# Patient Record
Sex: MALE | Race: WHITE | NOT HISPANIC OR LATINO | Employment: FULL TIME | URBAN - METROPOLITAN AREA
[De-identification: names, ages, dates, MRNs, and addresses within clinical notes are randomized per-mention and may not be internally consistent; named-entity substitution may affect disease eponyms.]

---

## 2024-04-05 ENCOUNTER — HOSPITAL ENCOUNTER (OUTPATIENT)
Facility: HOSPITAL | Age: 34
Setting detail: OBSERVATION
Discharge: HOME | End: 2024-04-06
Attending: STUDENT IN AN ORGANIZED HEALTH CARE EDUCATION/TRAINING PROGRAM | Admitting: INTERNAL MEDICINE
Payer: COMMERCIAL

## 2024-04-05 ENCOUNTER — APPOINTMENT (OUTPATIENT)
Dept: RADIOLOGY | Facility: HOSPITAL | Age: 34
End: 2024-04-05
Payer: COMMERCIAL

## 2024-04-05 DIAGNOSIS — R10.30 LOWER ABDOMINAL PAIN: ICD-10-CM

## 2024-04-05 DIAGNOSIS — D58.2 ELEVATED HEMOGLOBIN (CMS-HCC): Primary | ICD-10-CM

## 2024-04-05 PROBLEM — N17.9 ACUTE KIDNEY INJURY (CMS-HCC): Status: ACTIVE | Noted: 2024-04-05

## 2024-04-05 PROBLEM — G47.30 SLEEP APNEA: Status: ACTIVE | Noted: 2024-04-05

## 2024-04-05 PROBLEM — R10.84 GENERALIZED ABDOMINAL PAIN: Status: ACTIVE | Noted: 2024-04-05

## 2024-04-05 PROBLEM — E86.0 DEHYDRATION: Status: ACTIVE | Noted: 2024-04-05

## 2024-04-05 LAB
ALBUMIN SERPL BCP-MCNC: 5.3 G/DL (ref 3.4–5)
ALP SERPL-CCNC: 73 U/L (ref 33–120)
ALT SERPL W P-5'-P-CCNC: 17 U/L (ref 10–52)
ANION GAP SERPL CALC-SCNC: 12 MMOL/L (ref 10–20)
AST SERPL W P-5'-P-CCNC: 17 U/L (ref 9–39)
BASOPHILS # BLD AUTO: 0.08 X10*3/UL (ref 0–0.1)
BASOPHILS NFR BLD AUTO: 0.4 %
BILIRUB SERPL-MCNC: 0.6 MG/DL (ref 0–1.2)
BUN SERPL-MCNC: 16 MG/DL (ref 6–23)
CALCIUM SERPL-MCNC: 10.1 MG/DL (ref 8.6–10.3)
CHLORIDE SERPL-SCNC: 106 MMOL/L (ref 98–107)
CO2 SERPL-SCNC: 28 MMOL/L (ref 21–32)
CREAT SERPL-MCNC: 1.5 MG/DL (ref 0.5–1.3)
EGFRCR SERPLBLD CKD-EPI 2021: 63 ML/MIN/1.73M*2
EOSINOPHIL # BLD AUTO: 0.1 X10*3/UL (ref 0–0.7)
EOSINOPHIL NFR BLD AUTO: 0.5 %
ERYTHROCYTE [DISTWIDTH] IN BLOOD BY AUTOMATED COUNT: 12 % (ref 11.5–14.5)
GLUCOSE SERPL-MCNC: 113 MG/DL (ref 74–99)
HCT VFR BLD AUTO: 62.1 % (ref 41–52)
HGB BLD-MCNC: 22 G/DL (ref 13.5–17.5)
IMM GRANULOCYTES # BLD AUTO: 0.11 X10*3/UL (ref 0–0.7)
IMM GRANULOCYTES NFR BLD AUTO: 0.6 % (ref 0–0.9)
LACTATE SERPL-SCNC: 1.2 MMOL/L (ref 0.4–2)
LIPASE SERPL-CCNC: 13 U/L (ref 9–82)
LYMPHOCYTES # BLD AUTO: 1.2 X10*3/UL (ref 1.2–4.8)
LYMPHOCYTES NFR BLD AUTO: 6.2 %
MAGNESIUM SERPL-MCNC: 2.13 MG/DL (ref 1.6–2.4)
MCH RBC QN AUTO: 32.4 PG (ref 26–34)
MCHC RBC AUTO-ENTMCNC: 35.4 G/DL (ref 32–36)
MCV RBC AUTO: 92 FL (ref 80–100)
MONOCYTES # BLD AUTO: 1.09 X10*3/UL (ref 0.1–1)
MONOCYTES NFR BLD AUTO: 5.6 %
NEUTROPHILS # BLD AUTO: 16.85 X10*3/UL (ref 1.2–7.7)
NEUTROPHILS NFR BLD AUTO: 86.7 %
NRBC BLD-RTO: 0 /100 WBCS (ref 0–0)
PHOSPHATE SERPL-MCNC: 4.1 MG/DL (ref 2.5–4.9)
PLATELET # BLD AUTO: 189 X10*3/UL (ref 150–450)
POTASSIUM SERPL-SCNC: 4.7 MMOL/L (ref 3.5–5.3)
PROT SERPL-MCNC: 8.2 G/DL (ref 6.4–8.2)
RBC # BLD AUTO: 6.78 X10*6/UL (ref 4.5–5.9)
SODIUM SERPL-SCNC: 141 MMOL/L (ref 136–145)
WBC # BLD AUTO: 19.4 X10*3/UL (ref 4.4–11.3)

## 2024-04-05 PROCEDURE — 74177 CT ABD & PELVIS W/CONTRAST: CPT

## 2024-04-05 PROCEDURE — 96361 HYDRATE IV INFUSION ADD-ON: CPT

## 2024-04-05 PROCEDURE — 85025 COMPLETE CBC W/AUTO DIFF WBC: CPT | Performed by: STUDENT IN AN ORGANIZED HEALTH CARE EDUCATION/TRAINING PROGRAM

## 2024-04-05 PROCEDURE — 2500000004 HC RX 250 GENERAL PHARMACY W/ HCPCS (ALT 636 FOR OP/ED): Performed by: STUDENT IN AN ORGANIZED HEALTH CARE EDUCATION/TRAINING PROGRAM

## 2024-04-05 PROCEDURE — 83690 ASSAY OF LIPASE: CPT | Performed by: STUDENT IN AN ORGANIZED HEALTH CARE EDUCATION/TRAINING PROGRAM

## 2024-04-05 PROCEDURE — 83735 ASSAY OF MAGNESIUM: CPT | Performed by: STUDENT IN AN ORGANIZED HEALTH CARE EDUCATION/TRAINING PROGRAM

## 2024-04-05 PROCEDURE — 2500000004 HC RX 250 GENERAL PHARMACY W/ HCPCS (ALT 636 FOR OP/ED): Performed by: NURSE PRACTITIONER

## 2024-04-05 PROCEDURE — 74177 CT ABD & PELVIS W/CONTRAST: CPT | Mod: FOREIGN READ | Performed by: RADIOLOGY

## 2024-04-05 PROCEDURE — G0378 HOSPITAL OBSERVATION PER HR: HCPCS

## 2024-04-05 PROCEDURE — 83605 ASSAY OF LACTIC ACID: CPT | Performed by: STUDENT IN AN ORGANIZED HEALTH CARE EDUCATION/TRAINING PROGRAM

## 2024-04-05 PROCEDURE — 96374 THER/PROPH/DIAG INJ IV PUSH: CPT

## 2024-04-05 PROCEDURE — 2550000001 HC RX 255 CONTRASTS: Performed by: STUDENT IN AN ORGANIZED HEALTH CARE EDUCATION/TRAINING PROGRAM

## 2024-04-05 PROCEDURE — 84100 ASSAY OF PHOSPHORUS: CPT | Performed by: STUDENT IN AN ORGANIZED HEALTH CARE EDUCATION/TRAINING PROGRAM

## 2024-04-05 PROCEDURE — 36415 COLL VENOUS BLD VENIPUNCTURE: CPT | Performed by: STUDENT IN AN ORGANIZED HEALTH CARE EDUCATION/TRAINING PROGRAM

## 2024-04-05 PROCEDURE — 80053 COMPREHEN METABOLIC PANEL: CPT | Performed by: STUDENT IN AN ORGANIZED HEALTH CARE EDUCATION/TRAINING PROGRAM

## 2024-04-05 PROCEDURE — 99285 EMERGENCY DEPT VISIT HI MDM: CPT | Mod: 25

## 2024-04-05 PROCEDURE — 99222 1ST HOSP IP/OBS MODERATE 55: CPT | Performed by: NURSE PRACTITIONER

## 2024-04-05 PROCEDURE — 87040 BLOOD CULTURE FOR BACTERIA: CPT | Mod: PORLAB | Performed by: STUDENT IN AN ORGANIZED HEALTH CARE EDUCATION/TRAINING PROGRAM

## 2024-04-05 RX ORDER — ACETAMINOPHEN 325 MG/1
650 TABLET ORAL EVERY 4 HOURS PRN
Status: DISCONTINUED | OUTPATIENT
Start: 2024-04-05 | End: 2024-04-06 | Stop reason: HOSPADM

## 2024-04-05 RX ORDER — DEXTROAMPHETAMINE SACCHARATE, AMPHETAMINE ASPARTATE, DEXTROAMPHETAMINE SULFATE AND AMPHETAMINE SULFATE 2.5; 2.5; 2.5; 2.5 MG/1; MG/1; MG/1; MG/1
10 TABLET ORAL DAILY
COMMUNITY

## 2024-04-05 RX ORDER — ONDANSETRON HYDROCHLORIDE 2 MG/ML
4 INJECTION, SOLUTION INTRAVENOUS EVERY 8 HOURS PRN
Status: DISCONTINUED | OUTPATIENT
Start: 2024-04-05 | End: 2024-04-06 | Stop reason: HOSPADM

## 2024-04-05 RX ORDER — ONDANSETRON 4 MG/1
4 TABLET, FILM COATED ORAL EVERY 8 HOURS PRN
COMMUNITY

## 2024-04-05 RX ORDER — ARMODAFINIL 250 MG/1
250 TABLET ORAL DAILY
Status: DISCONTINUED | OUTPATIENT
Start: 2024-04-06 | End: 2024-04-06 | Stop reason: HOSPADM

## 2024-04-05 RX ORDER — SODIUM CHLORIDE, SODIUM LACTATE, POTASSIUM CHLORIDE, CALCIUM CHLORIDE 600; 310; 30; 20 MG/100ML; MG/100ML; MG/100ML; MG/100ML
125 INJECTION, SOLUTION INTRAVENOUS CONTINUOUS
Status: DISCONTINUED | OUTPATIENT
Start: 2024-04-05 | End: 2024-04-06 | Stop reason: HOSPADM

## 2024-04-05 RX ORDER — DICYCLOMINE HYDROCHLORIDE 10 MG/1
20 CAPSULE ORAL EVERY 8 HOURS PRN
Status: DISCONTINUED | OUTPATIENT
Start: 2024-04-05 | End: 2024-04-06 | Stop reason: HOSPADM

## 2024-04-05 RX ORDER — KETOROLAC TROMETHAMINE 30 MG/ML
15 INJECTION, SOLUTION INTRAMUSCULAR; INTRAVENOUS ONCE
Status: COMPLETED | OUTPATIENT
Start: 2024-04-05 | End: 2024-04-05

## 2024-04-05 RX ORDER — ACETAMINOPHEN 650 MG/1
650 SUPPOSITORY RECTAL EVERY 4 HOURS PRN
Status: DISCONTINUED | OUTPATIENT
Start: 2024-04-05 | End: 2024-04-06 | Stop reason: HOSPADM

## 2024-04-05 RX ORDER — DEXTROAMPHETAMINE SACCHARATE, AMPHETAMINE ASPARTATE, DEXTROAMPHETAMINE SULFATE AND AMPHETAMINE SULFATE 1.25; 1.25; 1.25; 1.25 MG/1; MG/1; MG/1; MG/1
10 TABLET ORAL DAILY
COMMUNITY
End: 2024-04-05 | Stop reason: ENTERED-IN-ERROR

## 2024-04-05 RX ORDER — ACETAMINOPHEN 160 MG/5ML
650 SOLUTION ORAL EVERY 4 HOURS PRN
Status: DISCONTINUED | OUTPATIENT
Start: 2024-04-05 | End: 2024-04-06 | Stop reason: HOSPADM

## 2024-04-05 RX ORDER — DEXTROAMPHETAMINE SACCHARATE, AMPHETAMINE ASPARTATE, DEXTROAMPHETAMINE SULFATE AND AMPHETAMINE SULFATE 1.25; 1.25; 1.25; 1.25 MG/1; MG/1; MG/1; MG/1
10 TABLET ORAL DAILY
Status: DISCONTINUED | OUTPATIENT
Start: 2024-04-05 | End: 2024-04-06 | Stop reason: HOSPADM

## 2024-04-05 RX ORDER — ARMODAFINIL 250 MG/1
250 TABLET ORAL DAILY
COMMUNITY

## 2024-04-05 RX ORDER — ONDANSETRON 4 MG/1
4 TABLET, ORALLY DISINTEGRATING ORAL EVERY 8 HOURS PRN
Status: DISCONTINUED | OUTPATIENT
Start: 2024-04-05 | End: 2024-04-06 | Stop reason: HOSPADM

## 2024-04-05 RX ADMIN — SODIUM CHLORIDE, POTASSIUM CHLORIDE, SODIUM LACTATE AND CALCIUM CHLORIDE 1000 ML: 600; 310; 30; 20 INJECTION, SOLUTION INTRAVENOUS at 14:17

## 2024-04-05 RX ADMIN — SODIUM CHLORIDE, POTASSIUM CHLORIDE, SODIUM LACTATE AND CALCIUM CHLORIDE 1000 ML: 600; 310; 30; 20 INJECTION, SOLUTION INTRAVENOUS at 11:20

## 2024-04-05 RX ADMIN — IOHEXOL 75 ML: 350 INJECTION, SOLUTION INTRAVENOUS at 12:32

## 2024-04-05 RX ADMIN — KETOROLAC TROMETHAMINE 15 MG: 30 INJECTION, SOLUTION INTRAMUSCULAR; INTRAVENOUS at 11:20

## 2024-04-05 RX ADMIN — SODIUM CHLORIDE, POTASSIUM CHLORIDE, SODIUM LACTATE AND CALCIUM CHLORIDE 125 ML/HR: 600; 310; 30; 20 INJECTION, SOLUTION INTRAVENOUS at 21:28

## 2024-04-05 SDOH — SOCIAL STABILITY: SOCIAL INSECURITY: DOES ANYONE TRY TO KEEP YOU FROM HAVING/CONTACTING OTHER FRIENDS OR DOING THINGS OUTSIDE YOUR HOME?: NO

## 2024-04-05 SDOH — SOCIAL STABILITY: SOCIAL INSECURITY: ARE THERE ANY APPARENT SIGNS OF INJURIES/BEHAVIORS THAT COULD BE RELATED TO ABUSE/NEGLECT?: NO

## 2024-04-05 SDOH — SOCIAL STABILITY: SOCIAL INSECURITY: HAVE YOU HAD THOUGHTS OF HARMING ANYONE ELSE?: NO

## 2024-04-05 SDOH — SOCIAL STABILITY: SOCIAL INSECURITY: HAS ANYONE EVER THREATENED TO HURT YOUR FAMILY OR YOUR PETS?: NO

## 2024-04-05 SDOH — SOCIAL STABILITY: SOCIAL INSECURITY: ARE YOU OR HAVE YOU BEEN THREATENED OR ABUSED PHYSICALLY, EMOTIONALLY, OR SEXUALLY BY ANYONE?: NO

## 2024-04-05 SDOH — SOCIAL STABILITY: SOCIAL INSECURITY: ABUSE: ADULT

## 2024-04-05 SDOH — SOCIAL STABILITY: SOCIAL INSECURITY: DO YOU FEEL ANYONE HAS EXPLOITED OR TAKEN ADVANTAGE OF YOU FINANCIALLY OR OF YOUR PERSONAL PROPERTY?: NO

## 2024-04-05 SDOH — SOCIAL STABILITY: SOCIAL INSECURITY: DO YOU FEEL UNSAFE GOING BACK TO THE PLACE WHERE YOU ARE LIVING?: NO

## 2024-04-05 ASSESSMENT — ACTIVITIES OF DAILY LIVING (ADL)
FEEDING YOURSELF: INDEPENDENT
HEARING - RIGHT EAR: FUNCTIONAL
BATHING: INDEPENDENT
ADEQUATE_TO_COMPLETE_ADL: YES
TOILETING: INDEPENDENT
PATIENT'S MEMORY ADEQUATE TO SAFELY COMPLETE DAILY ACTIVITIES?: YES
DRESSING YOURSELF: INDEPENDENT
GROOMING: INDEPENDENT
WALKS IN HOME: INDEPENDENT
JUDGMENT_ADEQUATE_SAFELY_COMPLETE_DAILY_ACTIVITIES: YES
HEARING - LEFT EAR: FUNCTIONAL
LACK_OF_TRANSPORTATION: NO

## 2024-04-05 ASSESSMENT — LIFESTYLE VARIABLES
HAVE PEOPLE ANNOYED YOU BY CRITICIZING YOUR DRINKING: NO
SUBSTANCE_ABUSE_PAST_12_MONTHS: NO
HAVE YOU EVER FELT YOU SHOULD CUT DOWN ON YOUR DRINKING: NO
HOW OFTEN DO YOU HAVE A DRINK CONTAINING ALCOHOL: NEVER
AUDIT-C TOTAL SCORE: 0
EVER FELT BAD OR GUILTY ABOUT YOUR DRINKING: NO
TOTAL SCORE: 0
HOW MANY STANDARD DRINKS CONTAINING ALCOHOL DO YOU HAVE ON A TYPICAL DAY: PATIENT DOES NOT DRINK
AUDIT-C TOTAL SCORE: 0
EVER HAD A DRINK FIRST THING IN THE MORNING TO STEADY YOUR NERVES TO GET RID OF A HANGOVER: NO
SKIP TO QUESTIONS 9-10: 1
HOW OFTEN DO YOU HAVE 6 OR MORE DRINKS ON ONE OCCASION: NEVER
PRESCIPTION_ABUSE_PAST_12_MONTHS: NO

## 2024-04-05 ASSESSMENT — PAIN - FUNCTIONAL ASSESSMENT
PAIN_FUNCTIONAL_ASSESSMENT: 0-10

## 2024-04-05 ASSESSMENT — PAIN DESCRIPTION - LOCATION
LOCATION: ABDOMEN

## 2024-04-05 ASSESSMENT — ENCOUNTER SYMPTOMS
NUMBNESS: 0
HALLUCINATIONS: 0
SHORTNESS OF BREATH: 0
CONFUSION: 0
COLOR CHANGE: 0
POLYDIPSIA: 0
BRUISES/BLEEDS EASILY: 0
BACK PAIN: 0
SINUS PAIN: 0
SORE THROAT: 0
NERVOUS/ANXIOUS: 0
APPETITE CHANGE: 0
EYE ITCHING: 0
SEIZURES: 0
FATIGUE: 0
BLOOD IN STOOL: 0
PALPITATIONS: 0
TREMORS: 0
SLEEP DISTURBANCE: 0
DIFFICULTY URINATING: 0
TROUBLE SWALLOWING: 0
HEADACHES: 0
CHILLS: 0
PHOTOPHOBIA: 0
WHEEZING: 0
UNEXPECTED WEIGHT CHANGE: 0
ARTHRALGIAS: 0
LIGHT-HEADEDNESS: 0
NECK PAIN: 0
NAUSEA: 0
COUGH: 0
DYSURIA: 0
CHOKING: 0
VOMITING: 0
FEVER: 0
CHEST TIGHTNESS: 0
DIZZINESS: 0
APNEA: 0
CONSTIPATION: 0
POLYPHAGIA: 0
DIARRHEA: 1
VOICE CHANGE: 0
WOUND: 0
EYE PAIN: 0
FREQUENCY: 0
WEAKNESS: 0
FLANK PAIN: 0
MYALGIAS: 0
HEMATURIA: 0
SPEECH DIFFICULTY: 0
ABDOMINAL PAIN: 1
EYE DISCHARGE: 0
DYSPHORIC MOOD: 0
ADENOPATHY: 0
NECK STIFFNESS: 0
ABDOMINAL DISTENTION: 0

## 2024-04-05 ASSESSMENT — COGNITIVE AND FUNCTIONAL STATUS - GENERAL
MOBILITY SCORE: 24
DAILY ACTIVITIY SCORE: 24
DAILY ACTIVITIY SCORE: 24
PATIENT BASELINE BEDBOUND: NO
MOBILITY SCORE: 24

## 2024-04-05 ASSESSMENT — PATIENT HEALTH QUESTIONNAIRE - PHQ9
2. FEELING DOWN, DEPRESSED OR HOPELESS: NOT AT ALL
SUM OF ALL RESPONSES TO PHQ9 QUESTIONS 1 & 2: 0
1. LITTLE INTEREST OR PLEASURE IN DOING THINGS: NOT AT ALL

## 2024-04-05 ASSESSMENT — COLUMBIA-SUICIDE SEVERITY RATING SCALE - C-SSRS
2. HAVE YOU ACTUALLY HAD ANY THOUGHTS OF KILLING YOURSELF?: NO
1. IN THE PAST MONTH, HAVE YOU WISHED YOU WERE DEAD OR WISHED YOU COULD GO TO SLEEP AND NOT WAKE UP?: NO
6. HAVE YOU EVER DONE ANYTHING, STARTED TO DO ANYTHING, OR PREPARED TO DO ANYTHING TO END YOUR LIFE?: NO

## 2024-04-05 ASSESSMENT — PAIN SCALES - GENERAL
PAINLEVEL_OUTOF10: 2
PAINLEVEL_OUTOF10: 2
PAINLEVEL_OUTOF10: 0 - NO PAIN
PAINLEVEL_OUTOF10: 8
PAINLEVEL_OUTOF10: 6
PAINLEVEL_OUTOF10: 7
PAINLEVEL_OUTOF10: 6

## 2024-04-05 ASSESSMENT — PAIN DESCRIPTION - PAIN TYPE: TYPE: ACUTE PAIN

## 2024-04-05 NOTE — ED NOTES
Pt arrives to ED via car from home with c/o abdominal pain, nausea, vomiting, and diarrhea that began this morning.     Code Status:  No Order    HPI     Chief Complaint   Patient presents with    Abdominal Pain     Since 7am today woken out sleep.  C/o nausea and vomiting.  Mostly diarrhea.  Took zofran pta       BP 97/56 (BP Location: Left arm, Patient Position: Sitting)   Pulse 80   Temp 37.2 °C (99 °F) (Tympanic)   Resp 20   Wt 63.5 kg (140 lb)   SpO2 98%     [place] Coma Scale Score: 15      LDA:   Peripheral IV 04/05/24 20 G Distal;Right;Upper Arm (Active)   Placement Date/Time: 04/05/24 1100   Hand Hygiene Completed: Yes  Size (Gauge): 20 G  Orientation: Distal;Right;Upper  Location: Arm  Insertion attempts: 1  Patient Tolerance: Tolerated well   Number of days: 0        BACKGROUND  No past medical history on file.  No past surgical history on file.  No current facility-administered medications on file prior to encounter.     No current outpatient medications on file prior to encounter.        ASSESSMENT  Diagnoses as of 04/05/24 1513   Elevated hemoglobin (CMS/HCC)   Lower abdominal pain       Medications Currently Running:        Medications Given:  ED Medication Administration from 04/05/2024 1015 to 04/05/2024 1513         Date/Time Order Dose Route Action Action by     04/05/2024 1120 EDT ketorolac (Toradol) injection 15 mg 15 mg intravenous Given IGNACIO Foote     04/05/2024 1120 EDT lactated Ringer's bolus 1,000 mL 1,000 mL intravenous New Bag IGNACIO Foote     04/05/2024 1220 EDT lactated Ringer's bolus 1,000 mL 0 mL intravenous Stopped IGNACIO Foote     04/05/2024 1232 EDT iohexol (OMNIPaque) 350 mg iodine/mL solution 75 mL 75 mL intravenous Given NICOLE Perdomo     04/05/2024 1417 EDT lactated Ringer's bolus 1,000 mL 1,000 mL intravenous New IGNACIO [place]                 RESULTS    Imaging:  CT abdomen pelvis w IV contrast   Final Result   Free fluid in the pelvis.  This does measure simple fluid.  There  is a   small amount of perihepatic and perisplenic ascites.   Otherwise unremarkable study.   Signed by Koffi Lemons MD         }  Labs ::99  Abnormal Labs Reviewed   CBC WITH AUTO DIFFERENTIAL - Abnormal; Notable for the following components:       Result Value    WBC 19.4 (*)     RBC 6.78 (*)     Hemoglobin 22.0 (*)     Hematocrit 62.1 (*)     Neutrophils Absolute 16.85 (*)     Monocytes Absolute 1.09 (*)     All other components within normal limits   COMPREHENSIVE METABOLIC PANEL - Abnormal; Notable for the following components:    Glucose 113 (*)     Creatinine 1.50 (*)     Albumin 5.3 (*)     All other components within normal limits                 Shell Foote RN  04/05/24 0460

## 2024-04-05 NOTE — ED NOTES
Attempted to call report to 2 [facility] nurse to call back.     Shell Foote, RN  04/05/24 7315

## 2024-04-05 NOTE — PROGRESS NOTES
Jose Anaya is a 33 y.o. male admitted for Dehydration. Pharmacy reviewed the patient's [id number] medications and allergies for accuracy.    The list below reflects the PTA list prior to pharmacy medication history. A summary a changes to the PTA medication list has been listed below. Please review each medication in order reconciliation for additional clarification and justification.    Source of information:  PATIENT    Medications added:  ARMODAFINIL 250MG 1 every day  DEXTRO/AMPHET 10MG 1 every day  ONDANSETRON 4MG 1Q8 PRN    Medications modified:    Medications to be removed:    Medications of concern:      None       Michelle Newby

## 2024-04-05 NOTE — H&P
History Obtained From: Patient    History Of Present Illness:  Jose Anaya is a 33 y.o. male with PMHx s/f chronic abdominal pain hypersomnolent syndrome sleep apnea presenting with abdominal pain and diarrhea.  The patient presented to emergency department complaining of diarrhea and abdominal pain.  He has had some abdominal pain almost his whole life.  Typically he gets episodes where he has pain in his middle and lower abdomen every couple weeks.  The pain seems to move across his abdomen and then will have extensive loose stools and then the patient will start to recover over a couple days.  This morning's episodes were severe the patient had to lay down in the shower and continued to have diarrhea.  On arrival to the emergency department the patient was afebrile at 97.2 but heart rate was tacky at 113 blood pressure 82/55 SpO2 98%, the patient was given 2 L of lactated Ringer's with improvement in his heart rate dropping down into the 80s respiratory rate remained stable at 20 and blood pressure improved to 109/69.  Lab studies in the emergency department showed the patient have a creatinine of 1.50 albumin 5.3 glucose 113 remainder of his comprehensive metabolic panel and magnesium level were negative lactate was normal.  CBC was significant for WBCs of 19.4 hemoglobin 22.0 hematocrit 62.1 platelets 189. t CT scan of the abdomen pelvis with IV contrast shows some free fluid in the pelvis small amount of perihepatic and perisplenic ascites otherwise unremarkable study.  He patient denies any fevers chills or rigors he denies any blood in his stool, is not having any urinary symptoms chest pain shortness of breath cough, he is not having any near-syncope or syncope.  The patient will be admitted for observation to continue IV hydration follow-up on renal function and to obtain hematology evaluation due to his significant elevation of hemoglobin.           ED Course:  Diagnoses as of 04/05/24 2333    Elevated hemoglobin (CMS/HCC)   Lower abdominal pain     Relevant Results  Results for orders placed or performed during the hospital encounter of 04/05/24 (from the past 24 hour(s))   CBC and Auto Differential   Result Value Ref Range    WBC 19.4 (H) 4.4 - 11.3 x10*3/uL    nRBC 0.0 0.0 - 0.0 /100 WBCs    RBC 6.78 (H) 4.50 - 5.90 x10*6/uL    Hemoglobin 22.0 (H) 13.5 - 17.5 g/dL    Hematocrit 62.1 (H) 41.0 - 52.0 %    MCV 92 80 - 100 fL    MCH 32.4 26.0 - 34.0 pg    MCHC 35.4 32.0 - 36.0 g/dL    RDW 12.0 11.5 - 14.5 %    Platelets 189 150 - 450 x10*3/uL    Neutrophils % 86.7 40.0 - 80.0 %    Immature Granulocytes %, Automated 0.6 0.0 - 0.9 %    Lymphocytes % 6.2 13.0 - 44.0 %    Monocytes % 5.6 2.0 - 10.0 %    Eosinophils % 0.5 0.0 - 6.0 %    Basophils % 0.4 0.0 - 2.0 %    Neutrophils Absolute 16.85 (H) 1.20 - 7.70 x10*3/uL    Immature Granulocytes Absolute, Automated 0.11 0.00 - 0.70 x10*3/uL    Lymphocytes Absolute 1.20 1.20 - 4.80 x10*3/uL    Monocytes Absolute 1.09 (H) 0.10 - 1.00 x10*3/uL    Eosinophils Absolute 0.10 0.00 - 0.70 x10*3/uL    Basophils Absolute 0.08 0.00 - 0.10 x10*3/uL   Comprehensive metabolic panel   Result Value Ref Range    Glucose 113 (H) 74 - 99 mg/dL    Sodium 141 136 - 145 mmol/L    Potassium 4.7 3.5 - 5.3 mmol/L    Chloride 106 98 - 107 mmol/L    Bicarbonate 28 21 - 32 mmol/L    Anion Gap 12 10 - 20 mmol/L    Urea Nitrogen 16 6 - 23 mg/dL    Creatinine 1.50 (H) 0.50 - 1.30 mg/dL    eGFR 63 >60 mL/min/1.73m*2    Calcium 10.1 8.6 - 10.3 mg/dL    Albumin 5.3 (H) 3.4 - 5.0 g/dL    Alkaline Phosphatase 73 33 - 120 U/L    Total Protein 8.2 6.4 - 8.2 g/dL    AST 17 9 - 39 U/L    Bilirubin, Total 0.6 0.0 - 1.2 mg/dL    ALT 17 10 - 52 U/L   Magnesium   Result Value Ref Range    Magnesium 2.13 1.60 - 2.40 mg/dL   Phosphorus   Result Value Ref Range    Phosphorus 4.1 2.5 - 4.9 mg/dL   Lipase   Result Value Ref Range    Lipase 13 9 - 82 U/L   Lactate   Result Value Ref Range    Lactate 1.2 0.4 -  2.0 mmol/L      CT abdomen pelvis w IV contrast    Result Date: 4/5/2024  STUDY: CT Abdomen and Pelvis with IV Contrast; 4/5/2024 12:35 PM INDICATION: Lower abdominal pain. COMPARISON: None Available. ACCESSION NUMBER(S): [id number] ORDERING CLINICIAN: KAITLIN GRAHAM TECHNIQUE: CT of the abdomen and pelvis was performed.  Contiguous axial images were obtained at 3 mm slice thickness through the abdomen and pelvis. Coronal and sagittal reconstructions at 3 mm slice thickness were performed.  Omnipaque 350--75 mL was administered intravenously.  FINDINGS: LOWER CHEST: No cardiomegaly.  No pericardial effusion.  Lung bases are clear.  ABDOMEN:  LIVER: No hepatomegaly.  Smooth surface contour.  Normal attenuation.  BILE DUCTS: No intrahepatic or extrahepatic biliary ductal dilatation.  GALLBLADDER: The gallbladder is unremarkable. STOMACH: No abnormalities identified.  PANCREAS: No masses or ductal dilatation.  SPLEEN: No splenomegaly or focal splenic lesion.  ADRENAL GLANDS: No thickening or nodules.  KIDNEYS AND URETERS: Kidneys are normal in size and location.  No renal or ureteral calculi.  PELVIS:  BLADDER: No abnormalities identified.  REPRODUCTIVE ORGANS: No abnormalities identified.  BOWEL: No abnormalities identified.  VESSELS: No abnormalities identified.  Abdominal aorta is normal in caliber.  PERITONEUM/RETROPERITONEUM/LYMPH NODES: There is free fluid in the pelvis.  No pneumoperitoneum. No lymphadenopathy.  ABDOMINAL WALL: No abnormalities identified. SOFT TISSUES: No abnormalities identified.  BONES: No acute fracture or aggressive osseous lesion.    Free fluid in the pelvis.  This does measure simple fluid.  There is a small amount of perihepatic and perisplenic ascites. Otherwise unremarkable study. Signed by Koffi Lemons MD     Scheduled medications:    Continuous medications:    PRN medications:        Past Medical History  He has no past medical history on file.    Surgical History  He has no past  surgical history on file.     Social History  He has no history on file for tobacco use, alcohol use, and drug use.    Family History  No family history on file.     Allergies  Patient has no known allergies.    Code Status  No Order     Review of Systems   Constitutional:  Negative for appetite change, chills, fatigue, fever and unexpected weight change.   HENT:  Negative for congestion, ear discharge, ear pain, mouth sores, nosebleeds, sinus pain, sore throat, trouble swallowing and voice change.    Eyes:  Negative for photophobia, pain, discharge, itching and visual disturbance.   Respiratory:  Negative for apnea, cough, choking, chest tightness, shortness of breath and wheezing.    Cardiovascular:  Negative for chest pain, palpitations and leg swelling.   Gastrointestinal:  Positive for abdominal pain and diarrhea. Negative for abdominal distention, blood in stool, constipation, nausea and vomiting.   Endocrine: Negative for cold intolerance, heat intolerance, polydipsia, polyphagia and polyuria.   Genitourinary:  Negative for decreased urine volume, difficulty urinating, dysuria, flank pain, frequency, hematuria and urgency.   Musculoskeletal:  Negative for arthralgias, back pain, gait problem, myalgias, neck pain and neck stiffness.   Skin:  Negative for color change, pallor and wound.   Allergic/Immunologic: Negative for food allergies and immunocompromised state.   Neurological:  Negative for dizziness, tremors, seizures, syncope, speech difficulty, weakness, light-headedness, numbness and headaches.   Hematological:  Negative for adenopathy. Does not bruise/bleed easily.   Psychiatric/Behavioral:  Negative for confusion, dysphoric mood, hallucinations, sleep disturbance and suicidal ideas. The patient is not nervous/anxious.        Last Recorded Vitals  /69 (BP Location: Left arm, Patient Position: Sitting)   Pulse 78   Temp 37.2 °C (99 °F) (Tympanic)   Resp 20   Wt 63.5 kg (140 lb)   SpO2 98%       Physical Exam  Vitals reviewed.   Constitutional:       General: He is not in acute distress.     Appearance: Normal appearance.   HENT:      Head: Normocephalic and atraumatic.      Right Ear: External ear normal.      Left Ear: External ear normal.      Nose: Nose normal.      Mouth/Throat:      Mouth: Mucous membranes are moist.      Pharynx: Oropharynx is clear.   Eyes:      General: No scleral icterus.     Extraocular Movements: Extraocular movements intact.      Conjunctiva/sclera: Conjunctivae normal.      Pupils: Pupils are equal, round, and reactive to light.   Cardiovascular:      Rate and Rhythm: Normal rate and regular rhythm.      Pulses: Normal pulses.      Heart sounds: Normal heart sounds.   Pulmonary:      Effort: Pulmonary effort is normal. No respiratory distress.      Breath sounds: Normal breath sounds. No wheezing, rhonchi or rales.   Chest:      Chest wall: No tenderness.   Abdominal:      General: Bowel sounds are normal. There is no distension.      Palpations: Abdomen is soft. There is no mass.      Tenderness: There is no abdominal tenderness. There is no rebound.   Musculoskeletal:         General: No swelling or deformity. Normal range of motion.      Cervical back: Normal range of motion.   Skin:     General: Skin is warm and dry.      Capillary Refill: Capillary refill takes less than 2 seconds.   Neurological:      General: No focal deficit present.      Mental Status: He is alert and oriented to person, place, and time.   Psychiatric:         Mood and Affect: Mood normal.         Behavior: Behavior normal.         Assessment/Plan             No new Assessment & Plan notes have been filed under this hospital service since the last note was generated.  Service: Internal Medicine     Dehydration  The patient had severe episode of abdominal pain with diarrhea and cramping  Patient presented and was dehydrated has been given 2 L of lactated Ringer's  Will continue gentle IV hydration  with lactated Ringer's overnight    Acute kidney injury  Suspect patient has acute kidney injury due to his rapid dehydration and associated low blood pressure on admission  Will check urine analysis check chemistry panel in the morning if not improving consider neurology evaluation    Abnormal hemoglobin  The patient's hemoglobin is 22  He denies any prior history of elevated hemoglobin or any awareness of this, his current hemoglobin may have been overstated due to volume depletion  Will request hematology evaluation  Patient does have a history of tobacco use, and sleep apnea    Abdominal pain  The patient has had extensive periods with abdominal pain almost his whole life  Episodes are intermittent pain generally improves a couple days after a liquid stool  CT of the abdomen pelvis with oral and IV contrast showed only small ascites no other significant pathology  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 Please contact for clarification if needed.

## 2024-04-05 NOTE — ED PROVIDER NOTES
"    HPI  Jose Anaya is a 33 y.o. male with no known medical conditions who is presenting with abdominal pain.  Patient states he woke up this morning with severe abdominal pain.  Patient states that usually in his lower abdomen.  Patient notes he subsequently had diarrhea.  Patient states he still having some abdominal pain.  Patient notes he has had similar abdominal pain almost every month for many years.  Patient states he was seen by gastroenterologist as a kid because of the symptoms and was told it was due to constipation.  Patient states he does not have any constipation.  He does endorse a couple episodes of emesis this morning.  Patient states this happens almost monthly.  He denies any fevers or chills but does endorse significant sweats with the pain.  Patient notes he is never been worked up for this.    PMH  No past medical history on file.    Meds  No current outpatient medications    Allergies  No Known Allergies     SHx       ------------------------------------------------------------------------------------------------------------------------------------------    BP 96/74 (BP Location: Left arm, Patient Position: Sitting)   Pulse 82   Temp 36.2 °C (97.2 °F)   Resp 20   Ht 1.778 m (5' 10\")   Wt 63.5 kg (140 lb)   SpO2 98%   BMI 20.09 kg/m²     Physical Exam  Constitutional:       General: He is not in acute distress.  HENT:      Head: Normocephalic and atraumatic.      Mouth/Throat:      Mouth: Mucous membranes are moist.   Eyes:      Extraocular Movements: Extraocular movements intact.      Conjunctiva/sclera: Conjunctivae normal.      Pupils: Pupils are equal, round, and reactive to light.   Cardiovascular:      Rate and Rhythm: Regular rhythm. Tachycardia present.      Heart sounds: No murmur heard.     No gallop.   Pulmonary:      Effort: Pulmonary effort is normal. No respiratory distress.      Breath sounds: Normal breath sounds. No wheezing.   Abdominal:      General: There is " no distension.      Palpations: Abdomen is soft.      Tenderness: There is abdominal tenderness in the right lower quadrant and suprapubic area. There is no guarding.   Musculoskeletal:         General: No swelling or signs of injury. Normal range of motion.   Skin:     General: Skin is warm and dry.      Findings: No lesion or rash.   Neurological:      General: No focal deficit present.      Mental Status: He is alert and oriented to person, place, and time. Mental status is at baseline.   Psychiatric:         Mood and Affect: Mood normal.         Judgment: Judgment normal.          ------------------------------------------------------------------------------------------------------------------------------------------    Medical Decision Making: Patient is a 33-year-old male presenting with abdominal pain.  Patient is tachycardic and borderline hypotensive on initial presentation.  He is overall nontoxic-appearing.  He has mild tenderness to palpation in the left inguinal and left lower quadrant as well as the suprapubic space.  Patient notes the symptoms happen every month which is concerning for something like IBD or porphyria.  Patient does appear dry and we will give him fluids.  CT of the abdomen and pelvis was ordered as well as lab work.  Patient lab work was notable for a leukocytosis with a white blood cell count of 19.4.  He also has a significantly elevated hemoglobin of 22 with a hematocrit of 62.1.  Platelets are normal at 189.  Further lab work is unremarkable.  Blood pressure did improve with fluids to  104/66 and his tachycardia improved.  CT imaging showed free fluid in the pelvis and a small amount of perihepatic and perisplenic ascites.  Patient's elevated hemoglobin concerning for polycythemia vera.  There may be a component of hemoconcentration however platelets are normal on blood work making this less likely.  Patient was given additional fluids and his blood pressure did improve.  Given  the elevated hemoglobin and free fluid concerning for ascites, patient would benefit from further workup and likely hematology evaluation.  Patient was in agreement with this plan and was admitted to the [facility] service for further treatment and management.    Diagnoses as of 04/06/24 1230   Elevated hemoglobin (CMS/HCC)   Lower abdominal pain       Discussed with: Admitting provider      Cecilia Solis MD  Emergency Medicine       Cecilia Solis MD  04/06/24 1233

## 2024-04-06 ENCOUNTER — PHARMACY VISIT (OUTPATIENT)
Dept: PHARMACY | Facility: CLINIC | Age: 34
End: 2024-04-06
Payer: MEDICARE

## 2024-04-06 VITALS
RESPIRATION RATE: 18 BRPM | OXYGEN SATURATION: 97 % | DIASTOLIC BLOOD PRESSURE: 69 MMHG | WEIGHT: 140 LBS | BODY MASS INDEX: 20.04 KG/M2 | HEIGHT: 70 IN | TEMPERATURE: 98.7 F | SYSTOLIC BLOOD PRESSURE: 113 MMHG | HEART RATE: 64 BPM

## 2024-04-06 PROBLEM — N17.9 ACUTE KIDNEY INJURY (CMS-HCC): Status: RESOLVED | Noted: 2024-04-05 | Resolved: 2024-04-06

## 2024-04-06 PROBLEM — E86.0 DEHYDRATION: Status: RESOLVED | Noted: 2024-04-05 | Resolved: 2024-04-06

## 2024-04-06 PROBLEM — D58.2 ABNORMAL HEMOGLOBIN (CMS-HCC): Status: RESOLVED | Noted: 2024-04-05 | Resolved: 2024-04-06

## 2024-04-06 LAB
ALBUMIN SERPL BCP-MCNC: 3.4 G/DL (ref 3.4–5)
ALP SERPL-CCNC: 44 U/L (ref 33–120)
ALT SERPL W P-5'-P-CCNC: 10 U/L (ref 10–52)
ANION GAP SERPL CALC-SCNC: 8 MMOL/L (ref 10–20)
AST SERPL W P-5'-P-CCNC: 10 U/L (ref 9–39)
BILIRUB SERPL-MCNC: 0.6 MG/DL (ref 0–1.2)
BUN SERPL-MCNC: 16 MG/DL (ref 6–23)
CALCIUM SERPL-MCNC: 7.9 MG/DL (ref 8.6–10.3)
CHLORIDE SERPL-SCNC: 109 MMOL/L (ref 98–107)
CO2 SERPL-SCNC: 26 MMOL/L (ref 21–32)
CREAT SERPL-MCNC: 0.8 MG/DL (ref 0.5–1.3)
EGFRCR SERPLBLD CKD-EPI 2021: >90 ML/MIN/1.73M*2
ERYTHROCYTE [DISTWIDTH] IN BLOOD BY AUTOMATED COUNT: 11.9 % (ref 11.5–14.5)
GLUCOSE SERPL-MCNC: 96 MG/DL (ref 74–99)
HCT VFR BLD AUTO: 38.1 % (ref 41–52)
HGB BLD-MCNC: 12.8 G/DL (ref 13.5–17.5)
MCH RBC QN AUTO: 31.4 PG (ref 26–34)
MCHC RBC AUTO-ENTMCNC: 33.6 G/DL (ref 32–36)
MCV RBC AUTO: 94 FL (ref 80–100)
NRBC BLD-RTO: 0 /100 WBCS (ref 0–0)
PLATELET # BLD AUTO: 121 X10*3/UL (ref 150–450)
POTASSIUM SERPL-SCNC: 3.7 MMOL/L (ref 3.5–5.3)
PROT SERPL-MCNC: 5 G/DL (ref 6.4–8.2)
RBC # BLD AUTO: 4.07 X10*6/UL (ref 4.5–5.9)
SODIUM SERPL-SCNC: 139 MMOL/L (ref 136–145)
WBC # BLD AUTO: 7.2 X10*3/UL (ref 4.4–11.3)

## 2024-04-06 PROCEDURE — 2500000002 HC RX 250 W HCPCS SELF ADMINISTERED DRUGS (ALT 637 FOR MEDICARE OP, ALT 636 FOR OP/ED): Performed by: NURSE PRACTITIONER

## 2024-04-06 PROCEDURE — 2500000001 HC RX 250 WO HCPCS SELF ADMINISTERED DRUGS (ALT 637 FOR MEDICARE OP): Performed by: NURSE PRACTITIONER

## 2024-04-06 PROCEDURE — G0378 HOSPITAL OBSERVATION PER HR: HCPCS

## 2024-04-06 PROCEDURE — RXMED WILLOW AMBULATORY MEDICATION CHARGE

## 2024-04-06 PROCEDURE — 84075 ASSAY ALKALINE PHOSPHATASE: CPT | Performed by: NURSE PRACTITIONER

## 2024-04-06 PROCEDURE — 85027 COMPLETE CBC AUTOMATED: CPT | Performed by: NURSE PRACTITIONER

## 2024-04-06 PROCEDURE — 99221 1ST HOSP IP/OBS SF/LOW 40: CPT | Performed by: INTERNAL MEDICINE

## 2024-04-06 PROCEDURE — 99239 HOSP IP/OBS DSCHRG MGMT >30: CPT | Performed by: INTERNAL MEDICINE

## 2024-04-06 PROCEDURE — 36415 COLL VENOUS BLD VENIPUNCTURE: CPT | Performed by: NURSE PRACTITIONER

## 2024-04-06 RX ORDER — DICYCLOMINE HYDROCHLORIDE 10 MG/1
20 CAPSULE ORAL EVERY 8 HOURS PRN
Qty: 30 CAPSULE | Refills: 0 | Status: SHIPPED | OUTPATIENT
Start: 2024-04-06

## 2024-04-06 RX ADMIN — DEXTROAMPHETAMINE SACCHARATE, AMPHETAMINE ASPARTATE, DEXTROAMPHETAMINE SULFATE AND AMPHETAMINE SULFATE 10 MG: 1.25; 1.25; 1.25; 1.25 TABLET ORAL at 09:18

## 2024-04-06 RX ADMIN — ARMODAFINIL 250 MG: 250 TABLET ORAL at 09:34

## 2024-04-06 ASSESSMENT — COGNITIVE AND FUNCTIONAL STATUS - GENERAL
MOBILITY SCORE: 24
DAILY ACTIVITIY SCORE: 24

## 2024-04-06 ASSESSMENT — PAIN SCALES - GENERAL: PAINLEVEL_OUTOF10: 0 - NO PAIN

## 2024-04-06 NOTE — CONSULTS
Reason For Consult  Erythrocytosis    History Of Present Illness  Jose Anaya is a 33 y.o. male with PMHx s/f chronic abdominal pain hypersomnolent syndrome sleep apnea presenting with abdominal pain and diarrhea. The patient presented to emergency department complaining of diarrhea and abdominal pain. He has had some abdominal pain almost his whole life. Typically he gets episodes where he has pain in his middle and lower abdomen every couple weeks. The pain seems to move across his abdomen and then will have extensive loose stools and then the patient will start to recover over a couple days.       CBC was significant for WBCs of 19.4 hemoglobin 22.0 hematocrit 62.1 platelets 189.    CT scan of the abdomen pelvis with IV contrast shows some free fluid in the pelvis small amount of perihepatic and perisplenic ascites otherwise unremarkable study. He patient denies any fevers chills or rigors he denies any blood in his stool, is not having any urinary symptoms chest pain shortness of breath cough, he is not having any near-syncope or syncope. The patient will be admitted for observation to continue IV hydration follow-up on renal function and to obtain hematology evaluation due to his significant elevation of hemoglobin.   Patient was admitted in the hospital hydrated properly.  Repeated   CBC did show hemoglobin 12.8.  This morning patient is feeling better no abdominal pain diarrhea also having resolved.  Patient going home today  Past Medical History  He has a past medical history of Narcolepsy.    Surgical History  He has no past surgical history on file.     Social History  He reports that he has never smoked. He has never used smokeless tobacco. No history on file for alcohol use and drug use.    Family History  No family history on file.     Allergies  Patient has no known allergies.    Review of Systems  Chronic abdominal pain, history of diarrhea every month, no weight loss no rectal bleed, patient  "still active working full-time     Physical Exam  Vitals are stable    Neck supple    Lung examination did show fair air movement on both side    Heart with normal regular rhythm    Abdomen soft, normotonic bowel sound, no mass, slight tenderness in the epigastric area    Extremity no edema cyanosis     Last Recorded Vitals  Blood pressure 113/69, pulse 64, temperature 37.1 °C (98.7 °F), temperature source Temporal, resp. rate 18, height 1.778 m (5' 10\"), weight 63.5 kg (140 lb), SpO2 97 %.    Relevant Results   Latest Reference Range & Units 04/05/24 11:15 04/06/24 04:11   WBC 4.4 - 11.3 x10*3/uL 19.4 (H) 7.2   nRBC 0.0 - 0.0 /100 WBCs 0.0 0.0   RBC 4.50 - 5.90 x10*6/uL 6.78 (H) 4.07 (L)   HEMOGLOBIN 13.5 - 17.5 g/dL 22.0 (H) 12.8 (L)   HEMATOCRIT 41.0 - 52.0 % 62.1 (H) 38.1 (L)   MCV 80 - 100 fL 92 94   MCH 26.0 - 34.0 pg 32.4 31.4   MCHC 32.0 - 36.0 g/dL 35.4 33.6   RED CELL DISTRIBUTION WIDTH 11.5 - 14.5 % 12.0 11.9   Platelets 150 - 450 x10*3/uL 189 121 (L)   (H): Data is abnormally high  (L): Data is abnormally low     Assessment/Plan     #1 history of chronic abdominal pain and episodes of diarrhea every month.  Clinically patient is feeling better this morning patient is suggested to have GI evaluation as outpatient    2.  Erythrocytosis due to dehydration and repeated CBC did show hemoglobin 12.8, hematocrit 38.1.  No further workup    Discussed with the patient    I spent 40 minutes in the professional and overall care of this patient.      Lyssa Colon MD    "

## 2024-04-06 NOTE — CARE PLAN
The patient's goals for the shift include      The clinical goals for the shift include pt will report decreased pain by end of shift      Problem: Pain  Goal: My pain/discomfort is manageable  Outcome: Progressing     Problem: Safety  Goal: Patient will be injury free during hospitalization  Outcome: Progressing  Goal: I will remain free of falls  Outcome: Progressing     Problem: Daily Care  Goal: Daily care needs are met  Outcome: Progressing     Problem: Psychosocial Needs  Goal: Demonstrates ability to cope with hospitalization/illness  Outcome: Progressing  Goal: Collaborate with me, my family, and caregiver to identify my specific goals  Outcome: Progressing     Problem: Discharge Barriers  Goal: My discharge needs are met  Outcome: Progressing

## 2024-04-06 NOTE — CARE PLAN
The patient's goals for the shift include      The clinical goals for the shift include pt will report decreased pain by end of shift    Over the shift, the patient did not make progress toward the following goals. Barriers to progression include . Recommendations to address these barriers include   Problem: Pain  Goal: My pain/discomfort is manageable  Outcome: Progressing     Problem: Safety  Goal: Patient will be injury free during hospitalization  Outcome: Progressing  Goal: I will remain free of falls  Outcome: Progressing     Problem: Daily Care  Goal: Daily care needs are met  Outcome: Progressing     Problem: Psychosocial Needs  Goal: Demonstrates ability to cope with hospitalization/illness  Outcome: Progressing  Goal: Collaborate with me, my family, and caregiver to identify my specific goals  Outcome: Progressing     Problem: Discharge Barriers  Goal: My discharge needs are met  Outcome: Progressing   .

## 2024-04-06 NOTE — DISCHARGE SUMMARY
Discharge Diagnosis  FRANCOIS secondary to acute on chronic diarrhea  Hemoconcentration        This discharge took greater than 35 minutes.    Test Results Pending At Discharge  Pending Labs       Order Current Status    Blood Culture Preliminary result    Blood Culture Preliminary result            Hospital Course   Jose Anaya is a 33 y.o. male with PMHx s/f chronic abdominal pain hypersomnolent syndrome sleep apnea presenting with abdominal pain and diarrhea.  The patient presented to emergency department complaining of diarrhea and abdominal pain.  He has had some abdominal pain almost his whole life.  Typically he gets episodes where he has pain in his middle and lower abdomen every couple weeks.  The pain seems to move across his abdomen and then will have extensive loose stools and then the patient will start to recover over a couple days.  This morning's episodes were severe the patient had to lay down in the shower and continued to have diarrhea.  On arrival to the emergency department the patient was afebrile at 97.2 but heart rate was tacky at 113 blood pressure 82/55 SpO2 98%, the patient was given 2 L of lactated Ringer's with improvement in his heart rate dropping down into the 80s respiratory rate remained stable at 20 and blood pressure improved to 109/69.  Lab studies in the emergency department showed the patient have a creatinine of 1.50 albumin 5.3 glucose 113 remainder of his comprehensive metabolic panel and magnesium level were negative lactate was normal.  CBC was significant for WBCs of 19.4 hemoglobin 22.0 hematocrit 62.1 platelets 189. t CT scan of the abdomen pelvis with IV contrast shows some free fluid in the pelvis small amount of perihepatic and perisplenic ascites otherwise unremarkable study.  He patient denies any fevers chills or rigors he denies any blood in his stool, is not having any urinary symptoms chest pain shortness of breath cough, he is not having any near-syncope or  syncope.  The patient will be admitted for observation to continue IV hydration follow-up on renal function and to obtain hematology evaluation due to his significant elevation of hemoglobin.     04/06: Patient condition significantly improved, renal function back to normal, hemoconcentration improved with IV fluid.  Patient desired going home.  Patient will be discharged home on the microsecond as needed.  Referral to GI was made for further evaluation of chronic diarrhea with abdominal pain.    Pertinent Physical Exam At Time of Discharge  Physical Exam  Patient is awake and orient, not in apparent distress  Eyes: PERRLA, no conjunctival congestion  Chest: Bilateral Air entry, no crackles or wheezing  Heart: s1S2 regular, no murmur  Abdomen: Soft, non tender, BS present  Ext:    Home Medications     Medication List      START taking these medications     dicyclomine 10 mg capsule; Commonly known as: Bentyl; Take 2 capsules   (20 mg) by mouth every 8 hours if needed (Abdominal discomfort).     CONTINUE taking these medications     amphetamine-dextroamphetamine 10 mg tablet; Commonly known as: Adderall   armodafinil 250 mg tablet; Commonly known as: Nuvigil   ondansetron 4 mg tablet; Commonly known as: Zofran       Outpatient Follow-Up  No follow-ups on file.     Sumit Justin MD  4/6/2024  11:01 AM

## 2024-04-09 LAB
BACTERIA BLD CULT: NORMAL
BACTERIA BLD CULT: NORMAL

## 2024-04-15 NOTE — PROGRESS NOTES
Jose Aanya is a 33 y.o. male who is referred by Dr. Sumit Justin for abdominal pain. He reports chronic abdominal pain since around age 11. Comes and goes. Used to be once a month. Did better for a few years 2247-1261, unknown why. Then pain returned and now occurring a few times per month (every 2-3 weeks). He has diarrhea occasionally (when he feels well and during pain episodes). Stools greasy. No bleeding. Feels like food poisoning episode as it will last 12 hours. He queries if he has a parasite as around time symptoms started he swam in a pond. He has no other symptoms such as nausea, vomiting, difficulty eating, or abnormal weight loss.    With most recent episode went to ER due to severity of pain. Labs show a mild normocytic anemia with Hgb 12.8. Platelets low 121. LFTs normal. CT 4/5/2024 showed free fluid in the pelvis and small amount of perihepatic/perisplenic ascites. Otherwise unremarkable. Given Bentyl but he has not had to take this yet as he has not yet had another episode.    He states he has hypersomnulence diagnosed by sleep study. May correlate with his flares of abdominal pain. He is exhausted frequently. Takes Adderall and Nuvigil to help with this. No prior EGD or colonoscopy. No prior surgical history.    Social history: Prior tobacco smoker. Quit 2020. Now vapes. Denies alcohol and illicit drugs.    Family history: Paternal great grandfather had colon cancer and paternal grandfather had prostate cancer. Mother has thyroid issues.     Past Medical History:   Diagnosis Date    Narcolepsy ([facility])      No past surgical history on file.    Current Outpatient Medications   Medication Sig Dispense Refill    amphetamine-dextroamphetamine (Adderall) 10 mg tablet Take 1 tablet (10 mg) by mouth once daily.      armodafinil (Nuvigil) 250 mg tablet Take 1 tablet (250 mg) by mouth once daily.      dicyclomine (Bentyl) 10 mg capsule Take 2 capsules (20 mg) by mouth every 8 hours if needed  "(Abdominal discomfort). 30 capsule 0    ondansetron (Zofran) 4 mg tablet Take 1 tablet (4 mg) by mouth every 8 hours if needed for nausea or vomiting.       No current facility-administered medications for this visit.     No Known Allergies    No family history on file.    Review of Systems  Review of Systems negative except as noted in HPI.    Objective     /72   Pulse 80   Temp 36.9 °C (98.4 °F)   Ht 1.778 m (5' 10\")   Wt 65.3 kg (144 lb)   BMI 20.66 kg/m²      Physical Exam  Constitutional:  No acute distress. Normal appearance. Not ill-appearing.  HENT:  Head normocephalic and atraumatic. Conjunctivae normal.  Cardiovascular:  Normal rate. Regular rhythm.  Pulmonary:  Pulmonary effort normal. No respiratory distress. Breath sounds clear.  Abdominal:  Abdomen is soft. There is no distension. LUQ tenderness with palpation. No rebound or guarding.  Skin: Dry.  Neurological:  Alert and oriented.  Psychiatric:  Mood and affect normal.    Assessment/Plan     33 y.o. male who presents today for initial clinic visit for chronic abdominal pain episodes that occur about twice a month. Associated with diarrhea and greasy stools. He may have a component of IBS as symptoms may correlate with his hypersomnulence diagnosed by sleep study. He states this often interferes with his quality of life. Possible small bowel process is also on differential and can consider MRI in the future. We also discussed colonoscopy which we will defer for now until review of stool tests.    In the meantime, I would like him to obtain repeat labs and see Hepatology due to perihepatic/perisplenic ascites seen on CT in the setting of low platelets. Reassuring that LFTs normal.    Recommendations  Continue Bentyl as needed for abdominal pain.  Obtain labs and stool tests including fecal calprotectin, pancreatic elastase, fecal fat.  Referral to Hepatology.  Follow up 6 weeks.     Electronically signed by: Tanesha Schmitz CNP on 4/18/2024 at " 1:59 PM

## 2024-04-18 ENCOUNTER — LAB (OUTPATIENT)
Dept: LAB | Facility: LAB | Age: 34
End: 2024-04-18
Payer: COMMERCIAL

## 2024-04-18 ENCOUNTER — OFFICE VISIT (OUTPATIENT)
Dept: GASTROENTEROLOGY | Facility: CLINIC | Age: 34
End: 2024-04-18
Payer: COMMERCIAL

## 2024-04-18 VITALS
WEIGHT: 144 LBS | HEIGHT: 70 IN | HEART RATE: 80 BPM | DIASTOLIC BLOOD PRESSURE: 72 MMHG | SYSTOLIC BLOOD PRESSURE: 134 MMHG | BODY MASS INDEX: 20.62 KG/M2 | TEMPERATURE: 98.4 F

## 2024-04-18 DIAGNOSIS — R19.7 DIARRHEA, UNSPECIFIED TYPE: ICD-10-CM

## 2024-04-18 DIAGNOSIS — R10.30 LOWER ABDOMINAL PAIN: Primary | ICD-10-CM

## 2024-04-18 DIAGNOSIS — R10.30 LOWER ABDOMINAL PAIN: ICD-10-CM

## 2024-04-18 DIAGNOSIS — R18.8 OTHER ASCITES: ICD-10-CM

## 2024-04-18 LAB
ALBUMIN SERPL BCP-MCNC: 4.8 G/DL (ref 3.4–5)
ALP SERPL-CCNC: 57 U/L (ref 33–120)
ALT SERPL W P-5'-P-CCNC: 19 U/L (ref 10–52)
ANION GAP SERPL CALC-SCNC: 13 MMOL/L (ref 10–20)
AST SERPL W P-5'-P-CCNC: 15 U/L (ref 9–39)
BILIRUB SERPL-MCNC: 0.4 MG/DL (ref 0–1.2)
BUN SERPL-MCNC: 11 MG/DL (ref 6–23)
CALCIUM SERPL-MCNC: 9.8 MG/DL (ref 8.6–10.6)
CHLORIDE SERPL-SCNC: 102 MMOL/L (ref 98–107)
CO2 SERPL-SCNC: 28 MMOL/L (ref 21–32)
CREAT SERPL-MCNC: 0.91 MG/DL (ref 0.5–1.3)
CRP SERPL-MCNC: <0.1 MG/DL
EGFRCR SERPLBLD CKD-EPI 2021: >90 ML/MIN/1.73M*2
ERYTHROCYTE [DISTWIDTH] IN BLOOD BY AUTOMATED COUNT: 11.7 % (ref 11.5–14.5)
GLUCOSE SERPL-MCNC: 95 MG/DL (ref 74–99)
HCT VFR BLD AUTO: 44.1 % (ref 41–52)
HGB BLD-MCNC: 15.1 G/DL (ref 13.5–17.5)
MCH RBC QN AUTO: 31.8 PG (ref 26–34)
MCHC RBC AUTO-ENTMCNC: 34.2 G/DL (ref 32–36)
MCV RBC AUTO: 93 FL (ref 80–100)
NRBC BLD-RTO: 0 /100 WBCS (ref 0–0)
PLATELET # BLD AUTO: 147 X10*3/UL (ref 150–450)
POTASSIUM SERPL-SCNC: 4.3 MMOL/L (ref 3.5–5.3)
PROT SERPL-MCNC: 6.9 G/DL (ref 6.4–8.2)
RBC # BLD AUTO: 4.75 X10*6/UL (ref 4.5–5.9)
SODIUM SERPL-SCNC: 139 MMOL/L (ref 136–145)
WBC # BLD AUTO: 7.2 X10*3/UL (ref 4.4–11.3)

## 2024-04-18 PROCEDURE — 99214 OFFICE O/P EST MOD 30 MIN: CPT | Performed by: NURSE PRACTITIONER

## 2024-04-18 PROCEDURE — 99204 OFFICE O/P NEW MOD 45 MIN: CPT | Performed by: NURSE PRACTITIONER

## 2024-04-18 PROCEDURE — 85027 COMPLETE CBC AUTOMATED: CPT

## 2024-04-18 PROCEDURE — 86140 C-REACTIVE PROTEIN: CPT

## 2024-04-18 PROCEDURE — 36415 COLL VENOUS BLD VENIPUNCTURE: CPT

## 2024-04-18 PROCEDURE — 80053 COMPREHEN METABOLIC PANEL: CPT

## 2024-04-18 NOTE — PATIENT INSTRUCTIONS
Recommendations  Continue Bentyl as needed for abdominal pain.  Obtain labs and stool tests.  Consider Hepatology for perisplenic ascites seen on CT scan.  Follow up 6 weeks. Please call the office at [phone] with any questions or concerns.

## 2024-04-19 PROCEDURE — 82653 EL-1 FECAL QUANTITATIVE: CPT

## 2024-04-19 PROCEDURE — 87328 CRYPTOSPORIDIUM AG IA: CPT

## 2024-04-19 PROCEDURE — 89125 SPECIMEN FAT STAIN: CPT

## 2024-04-19 PROCEDURE — 83993 ASSAY FOR CALPROTECTIN FECAL: CPT

## 2024-04-19 PROCEDURE — 87329 GIARDIA AG IA: CPT

## 2024-04-19 PROCEDURE — 87506 IADNA-DNA/RNA PROBE TQ 6-11: CPT

## 2024-04-20 LAB

## 2024-04-23 LAB
CRYPTOSP AG STL QL IA: NEGATIVE
FAT STL QL: NORMAL
G LAMBLIA AG STL QL IA: NEGATIVE
NEUTRAL FAT STL QL: NORMAL
O+P STL MICRO: NEGATIVE

## 2024-04-24 LAB
CALPROTECTIN STL-MCNT: 13 UG/G
ELASTASE PANC STL-MCNT: >800 UG/G

## 2025-01-21 ENCOUNTER — APPOINTMENT (OUTPATIENT)
Dept: CARDIOLOGY | Facility: HOSPITAL | Age: 35
End: 2025-01-21
Payer: COMMERCIAL

## 2025-01-21 ENCOUNTER — APPOINTMENT (OUTPATIENT)
Dept: RADIOLOGY | Facility: HOSPITAL | Age: 35
End: 2025-01-21
Payer: COMMERCIAL

## 2025-01-21 ENCOUNTER — PHARMACY VISIT (OUTPATIENT)
Dept: PHARMACY | Facility: CLINIC | Age: 35
End: 2025-01-21
Payer: MEDICARE

## 2025-01-21 ENCOUNTER — HOSPITAL ENCOUNTER (EMERGENCY)
Facility: HOSPITAL | Age: 35
Discharge: HOME | End: 2025-01-21
Attending: EMERGENCY MEDICINE
Payer: COMMERCIAL

## 2025-01-21 VITALS
TEMPERATURE: 99.9 F | RESPIRATION RATE: 20 BRPM | HEIGHT: 71 IN | SYSTOLIC BLOOD PRESSURE: 100 MMHG | BODY MASS INDEX: 19.6 KG/M2 | WEIGHT: 140 LBS | DIASTOLIC BLOOD PRESSURE: 62 MMHG | OXYGEN SATURATION: 97 % | HEART RATE: 95 BPM

## 2025-01-21 DIAGNOSIS — J18.9 PNEUMONIA OF LEFT LOWER LOBE DUE TO INFECTIOUS ORGANISM: Primary | ICD-10-CM

## 2025-01-21 LAB
ALBUMIN SERPL BCP-MCNC: 4.8 G/DL (ref 3.4–5)
ALP SERPL-CCNC: 56 U/L (ref 33–120)
ALT SERPL W P-5'-P-CCNC: 10 U/L (ref 10–52)
ANION GAP SERPL CALC-SCNC: 13 MMOL/L (ref 10–20)
APPEARANCE UR: CLEAR
AST SERPL W P-5'-P-CCNC: 11 U/L (ref 9–39)
BASOPHILS # BLD AUTO: 0.03 X10*3/UL (ref 0–0.1)
BASOPHILS NFR BLD AUTO: 0.3 %
BILIRUB SERPL-MCNC: 0.9 MG/DL (ref 0–1.2)
BILIRUB UR STRIP.AUTO-MCNC: NEGATIVE MG/DL
BUN SERPL-MCNC: 15 MG/DL (ref 6–23)
CALCIUM SERPL-MCNC: 9.5 MG/DL (ref 8.6–10.3)
CARDIAC TROPONIN I PNL SERPL HS: 5 NG/L (ref 0–20)
CHLORIDE SERPL-SCNC: 101 MMOL/L (ref 98–107)
CO2 SERPL-SCNC: 24 MMOL/L (ref 21–32)
COLOR UR: YELLOW
CREAT SERPL-MCNC: 1.22 MG/DL (ref 0.5–1.3)
EGFRCR SERPLBLD CKD-EPI 2021: 80 ML/MIN/1.73M*2
EOSINOPHIL # BLD AUTO: 0.01 X10*3/UL (ref 0–0.7)
EOSINOPHIL NFR BLD AUTO: 0.1 %
ERYTHROCYTE [DISTWIDTH] IN BLOOD BY AUTOMATED COUNT: 11.6 % (ref 11.5–14.5)
FLUAV RNA RESP QL NAA+PROBE: NOT DETECTED
FLUBV RNA RESP QL NAA+PROBE: NOT DETECTED
GLUCOSE SERPL-MCNC: 101 MG/DL (ref 74–99)
GLUCOSE UR STRIP.AUTO-MCNC: NORMAL MG/DL
HCT VFR BLD AUTO: 45.6 % (ref 41–52)
HGB BLD-MCNC: 15.9 G/DL (ref 13.5–17.5)
HOLD SPECIMEN: NORMAL
IMM GRANULOCYTES # BLD AUTO: 0.17 X10*3/UL (ref 0–0.7)
IMM GRANULOCYTES NFR BLD AUTO: 1.5 % (ref 0–0.9)
KETONES UR STRIP.AUTO-MCNC: NEGATIVE MG/DL
LACTATE SERPL-SCNC: 0.9 MMOL/L (ref 0.4–2)
LEUKOCYTE ESTERASE UR QL STRIP.AUTO: NEGATIVE
LYMPHOCYTES # BLD AUTO: 0.41 X10*3/UL (ref 1.2–4.8)
LYMPHOCYTES NFR BLD AUTO: 3.6 %
MCH RBC QN AUTO: 31.6 PG (ref 26–34)
MCHC RBC AUTO-ENTMCNC: 34.9 G/DL (ref 32–36)
MCV RBC AUTO: 91 FL (ref 80–100)
MONOCYTES # BLD AUTO: 0.45 X10*3/UL (ref 0.1–1)
MONOCYTES NFR BLD AUTO: 4 %
MUCOUS THREADS #/AREA URNS AUTO: NORMAL /LPF
NEUTROPHILS # BLD AUTO: 10.28 X10*3/UL (ref 1.2–7.7)
NEUTROPHILS NFR BLD AUTO: 90.5 %
NITRITE UR QL STRIP.AUTO: NEGATIVE
NRBC BLD-RTO: 0 /100 WBCS (ref 0–0)
PH UR STRIP.AUTO: 7 [PH]
PLATELET # BLD AUTO: 122 X10*3/UL (ref 150–450)
POTASSIUM SERPL-SCNC: 3.8 MMOL/L (ref 3.5–5.3)
PROT SERPL-MCNC: 7.6 G/DL (ref 6.4–8.2)
PROT UR STRIP.AUTO-MCNC: ABNORMAL MG/DL
RBC # BLD AUTO: 5.03 X10*6/UL (ref 4.5–5.9)
RBC # UR STRIP.AUTO: NEGATIVE /UL
RBC #/AREA URNS AUTO: NORMAL /HPF
RSV RNA RESP QL NAA+PROBE: NOT DETECTED
S PYO DNA THROAT QL NAA+PROBE: NOT DETECTED
SARS-COV-2 RNA RESP QL NAA+PROBE: NOT DETECTED
SODIUM SERPL-SCNC: 134 MMOL/L (ref 136–145)
SP GR UR STRIP.AUTO: 1.04
UROBILINOGEN UR STRIP.AUTO-MCNC: NORMAL MG/DL
WBC # BLD AUTO: 11.4 X10*3/UL (ref 4.4–11.3)
WBC #/AREA URNS AUTO: NORMAL /HPF

## 2025-01-21 PROCEDURE — 74177 CT ABD & PELVIS W/CONTRAST: CPT

## 2025-01-21 PROCEDURE — 93005 ELECTROCARDIOGRAM TRACING: CPT

## 2025-01-21 PROCEDURE — 80053 COMPREHEN METABOLIC PANEL: CPT | Performed by: PHYSICIAN ASSISTANT

## 2025-01-21 PROCEDURE — 96361 HYDRATE IV INFUSION ADD-ON: CPT

## 2025-01-21 PROCEDURE — 87651 STREP A DNA AMP PROBE: CPT | Performed by: PHYSICIAN ASSISTANT

## 2025-01-21 PROCEDURE — 2550000001 HC RX 255 CONTRASTS: Performed by: PHYSICIAN ASSISTANT

## 2025-01-21 PROCEDURE — 71045 X-RAY EXAM CHEST 1 VIEW: CPT | Performed by: RADIOLOGY

## 2025-01-21 PROCEDURE — 74177 CT ABD & PELVIS W/CONTRAST: CPT | Performed by: RADIOLOGY

## 2025-01-21 PROCEDURE — 83605 ASSAY OF LACTIC ACID: CPT | Performed by: PHYSICIAN ASSISTANT

## 2025-01-21 PROCEDURE — 87636 SARSCOV2 & INF A&B AMP PRB: CPT | Performed by: PHYSICIAN ASSISTANT

## 2025-01-21 PROCEDURE — 2500000001 HC RX 250 WO HCPCS SELF ADMINISTERED DRUGS (ALT 637 FOR MEDICARE OP): Performed by: PHYSICIAN ASSISTANT

## 2025-01-21 PROCEDURE — 87634 RSV DNA/RNA AMP PROBE: CPT | Performed by: PHYSICIAN ASSISTANT

## 2025-01-21 PROCEDURE — 96367 TX/PROPH/DG ADDL SEQ IV INF: CPT

## 2025-01-21 PROCEDURE — 85025 COMPLETE CBC W/AUTO DIFF WBC: CPT | Performed by: PHYSICIAN ASSISTANT

## 2025-01-21 PROCEDURE — 2500000004 HC RX 250 GENERAL PHARMACY W/ HCPCS (ALT 636 FOR OP/ED): Performed by: PHYSICIAN ASSISTANT

## 2025-01-21 PROCEDURE — 84484 ASSAY OF TROPONIN QUANT: CPT | Performed by: EMERGENCY MEDICINE

## 2025-01-21 PROCEDURE — 36415 COLL VENOUS BLD VENIPUNCTURE: CPT | Performed by: PHYSICIAN ASSISTANT

## 2025-01-21 PROCEDURE — 96375 TX/PRO/DX INJ NEW DRUG ADDON: CPT

## 2025-01-21 PROCEDURE — 87040 BLOOD CULTURE FOR BACTERIA: CPT | Mod: PORLAB | Performed by: PHYSICIAN ASSISTANT

## 2025-01-21 PROCEDURE — 71045 X-RAY EXAM CHEST 1 VIEW: CPT

## 2025-01-21 PROCEDURE — 99285 EMERGENCY DEPT VISIT HI MDM: CPT | Mod: 25 | Performed by: EMERGENCY MEDICINE

## 2025-01-21 PROCEDURE — 81001 URINALYSIS AUTO W/SCOPE: CPT | Performed by: PHYSICIAN ASSISTANT

## 2025-01-21 PROCEDURE — RXMED WILLOW AMBULATORY MEDICATION CHARGE

## 2025-01-21 PROCEDURE — 96365 THER/PROPH/DIAG IV INF INIT: CPT | Mod: 59

## 2025-01-21 RX ORDER — ACETAMINOPHEN 325 MG/1
975 TABLET ORAL ONCE
Status: COMPLETED | OUTPATIENT
Start: 2025-01-21 | End: 2025-01-21

## 2025-01-21 RX ORDER — AZITHROMYCIN 500 MG/1
500 TABLET, FILM COATED ORAL DAILY
Qty: 5 TABLET | Refills: 0 | Status: SHIPPED | OUTPATIENT
Start: 2025-01-21 | End: 2025-01-26

## 2025-01-21 RX ORDER — IBUPROFEN 400 MG/1
400 TABLET ORAL ONCE
Status: COMPLETED | OUTPATIENT
Start: 2025-01-21 | End: 2025-01-21

## 2025-01-21 RX ORDER — AMOXICILLIN 500 MG/1
1000 CAPSULE ORAL 3 TIMES DAILY
Qty: 60 CAPSULE | Refills: 0 | Status: SHIPPED | OUTPATIENT
Start: 2025-01-21 | End: 2025-01-31

## 2025-01-21 RX ORDER — ONDANSETRON 4 MG/1
4 TABLET, FILM COATED ORAL EVERY 6 HOURS
Qty: 12 TABLET | Refills: 0 | Status: SHIPPED | OUTPATIENT
Start: 2025-01-21 | End: 2025-01-24

## 2025-01-21 RX ORDER — CEFTRIAXONE 2 G/50ML
2 INJECTION, SOLUTION INTRAVENOUS EVERY 24 HOURS
Status: DISCONTINUED | OUTPATIENT
Start: 2025-01-21 | End: 2025-01-21 | Stop reason: HOSPADM

## 2025-01-21 RX ORDER — ONDANSETRON HYDROCHLORIDE 2 MG/ML
4 INJECTION, SOLUTION INTRAVENOUS ONCE
Status: COMPLETED | OUTPATIENT
Start: 2025-01-21 | End: 2025-01-21

## 2025-01-21 RX ADMIN — IBUPROFEN 400 MG: 400 TABLET, FILM COATED ORAL at 10:59

## 2025-01-21 RX ADMIN — CEFTRIAXONE SODIUM 2 G: 2 INJECTION, SOLUTION INTRAVENOUS at 13:45

## 2025-01-21 RX ADMIN — ACETAMINOPHEN 975 MG: 325 TABLET ORAL at 10:59

## 2025-01-21 RX ADMIN — ONDANSETRON 4 MG: 2 INJECTION INTRAMUSCULAR; INTRAVENOUS at 14:27

## 2025-01-21 RX ADMIN — SODIUM CHLORIDE 1000 ML: 9 INJECTION, SOLUTION INTRAVENOUS at 11:02

## 2025-01-21 RX ADMIN — AZITHROMYCIN MONOHYDRATE 490.2 MG: 500 INJECTION, POWDER, LYOPHILIZED, FOR SOLUTION INTRAVENOUS at 14:15

## 2025-01-21 RX ADMIN — IOHEXOL 75 ML: 350 INJECTION, SOLUTION INTRAVENOUS at 12:32

## 2025-01-21 ASSESSMENT — PAIN DESCRIPTION - LOCATION
LOCATION: GENERALIZED
LOCATION: GENERALIZED

## 2025-01-21 ASSESSMENT — COLUMBIA-SUICIDE SEVERITY RATING SCALE - C-SSRS
1. IN THE PAST MONTH, HAVE YOU WISHED YOU WERE DEAD OR WISHED YOU COULD GO TO SLEEP AND NOT WAKE UP?: NO
2. HAVE YOU ACTUALLY HAD ANY THOUGHTS OF KILLING YOURSELF?: NO
6. HAVE YOU EVER DONE ANYTHING, STARTED TO DO ANYTHING, OR PREPARED TO DO ANYTHING TO END YOUR LIFE?: NO

## 2025-01-21 ASSESSMENT — LIFESTYLE VARIABLES
EVER FELT BAD OR GUILTY ABOUT YOUR DRINKING: NO
HAVE PEOPLE ANNOYED YOU BY CRITICIZING YOUR DRINKING: NO
HAVE YOU EVER FELT YOU SHOULD CUT DOWN ON YOUR DRINKING: NO

## 2025-01-21 ASSESSMENT — PAIN - FUNCTIONAL ASSESSMENT
PAIN_FUNCTIONAL_ASSESSMENT: 0-10
PAIN_FUNCTIONAL_ASSESSMENT: 0-10

## 2025-01-21 ASSESSMENT — PAIN SCALES - GENERAL
PAINLEVEL_OUTOF10: 4

## 2025-01-21 ASSESSMENT — PAIN DESCRIPTION - PAIN TYPE
TYPE: ACUTE PAIN
TYPE: ACUTE PAIN

## 2025-01-21 NOTE — ED TRIAGE NOTES
Pt presents for flu like symptoms that started last night. Kids at home have been sick. HR for EMS was in 135 with /68. EMS initiated a bolus of NS with improvements on the BP. Pt states he has a sore throat, headache, body aches, fever and chills. Alert and oriented x's 4. No meds today

## 2025-01-21 NOTE — ED PROVIDER NOTES
EMERGENCY MEDICINE EVALUATION NOTE    History of Present Illness     Chief Complaint:   Chief Complaint   Patient presents with    Flu Symptoms       HPI: Jose Anaya is a 34 y.o. male presents with a chief complaint of multiple medical complaints.  Patient reports that he is had upper respiratory symptoms since yesterday after being around his child who was ill.  He states that he has a cough.  He notes that today he started to feel very ill and started feeling his heart was racing and had a fever.  Patient also reports that he has been having these episodes intermittently of lower abdominal pain.  He states that an episode yesterday of sharp stabbing lower abdominal pain but it has relented to just cramping today.  Patient denies any urinary symptoms or change in his bowel habits.  Patient does report nausea but no vomiting.  He states that he has not taken anything at home for fever today but has noticed that he did have a fever.  Patient denies any current chest pain and denies any significant shortness of breath.  Patient reports that his cough is nonproductive.    Previous History     Past Medical History:   Diagnosis Date    Narcolepsy ([facility])      No past surgical history on file.  Social History     Tobacco Use    Smoking status: Never    Smokeless tobacco: Never   Vaping Use    Vaping status: Every Day    Substances: Nicotine     No family history on file.  No Known Allergies  Current Outpatient Medications   Medication Instructions    amoxicillin (AMOXIL) 1,000 mg, oral, 3 times daily    amphetamine-dextroamphetamine (Adderall) 10 mg tablet 10 mg, oral, Daily    armodafinil (NUVIGIL) 250 mg, oral, Daily    azithromycin (ZITHROMAX) 500 mg, oral, Daily    dicyclomine (BENTYL) 20 mg, oral, Every 8 hours PRN    ondansetron (ZOFRAN) 4 mg, oral, Every 8 hours PRN    ondansetron (ZOFRAN) 4 mg, oral, Every 6 hours       Physical Exam     Appearance: Alert, oriented , cooperative.  Warm to the touch.      Skin: Intact,  dry skin, no lesions, rash, petechiae or purpura.      Eyes: PERRLA, EOMs intact,  Conjunctiva pink      ENT: Hearing grossly intact. Pharynx clear, uvula midline.      Neck: Supple. Trachea at midline.      Pulmonary: Clear bilaterally. No rales, rhonchi or wheezing. No accessory muscle use or stridor.  Dry cough throughout examination.     Cardiac: Tachycardic.     Abdomen: Soft, active bowel sounds.  Diffuse lower abdominal pain with no cramping.     Musculoskeletal: Full range of motion.      Neurological:Cranial nerves II through XII are grossly intact, normal sensation, no weakness, no focal findings identified.     Results     Labs Reviewed   CBC WITH AUTO DIFFERENTIAL - Abnormal       Result Value    WBC 11.4 (*)     nRBC 0.0      RBC 5.03      Hemoglobin 15.9      Hematocrit 45.6      MCV 91      MCH 31.6      MCHC 34.9      RDW 11.6      Platelets 122 (*)     Neutrophils % 90.5      Immature Granulocytes %, Automated 1.5 (*)     Lymphocytes % 3.6      Monocytes % 4.0      Eosinophils % 0.1      Basophils % 0.3      Neutrophils Absolute 10.28 (*)     Immature Granulocytes Absolute, Automated 0.17      Lymphocytes Absolute 0.41 (*)     Monocytes Absolute 0.45      Eosinophils Absolute 0.01      Basophils Absolute 0.03     COMPREHENSIVE METABOLIC PANEL - Abnormal    Glucose 101 (*)     Sodium 134 (*)     Potassium 3.8      Chloride 101      Bicarbonate 24      Anion Gap 13      Urea Nitrogen 15      Creatinine 1.22      eGFR 80      Calcium 9.5      Albumin 4.8      Alkaline Phosphatase 56      Total Protein 7.6      AST 11      Bilirubin, Total 0.9      ALT 10     URINALYSIS WITH REFLEX CULTURE AND MICROSCOPIC - Abnormal    Color, Urine Yellow      Appearance, Urine Clear      Specific Gravity, Urine 1.039 (*)     pH, Urine 7.0      Protein, Urine 10 (TRACE)      Glucose, Urine Normal      Blood, Urine NEGATIVE      Ketones, Urine NEGATIVE      Bilirubin, Urine NEGATIVE      Urobilinogen,  Urine Normal      Nitrite, Urine NEGATIVE      Leukocyte Esterase, Urine NEGATIVE     GROUP A STREPTOCOCCUS, PCR - Normal    Group A Strep PCR Not Detected     LACTATE - Normal    Lactate 0.9      Narrative:     Venipuncture immediately after or during the administration of Metamizole may lead to falsely low results. Testing should be performed immediately prior to Metamizole dosing.   INFLUENZA A AND B PCR - Normal    Flu A Result Not Detected      Flu B Result Not Detected      Narrative:     This assay is an in vitro diagnostic multiplex nucleic acid amplification test for the detection and discrimination of Influenza A & B from nasopharyngeal specimens, and has been validated for use at [facility]. Negative results do not preclude Influenza A/B infections, and should not be used as the sole basis for diagnosis, treatment, or other management decisions. If Influenza A/B and RSV PCR results are negative, testing for Parainfluenza virus, Adenovirus and Metapneumovirus is routinely performed for [facility] pediatric oncology and intensive care inpatients, and is available on other patients by placing an add-on request.   SARS-COV-2 PCR - Normal    Coronavirus 2019, PCR Not Detected      Narrative:     This assay is an FDA-cleared, in vitro diagnostic nucleic acid amplification test for the qualitative detection and differentiation of SARS CoV-2 from nasopharyngeal specimens collected from individuals with signs and symptoms of respiratory tract infections, and has been validated for use at [facility]. Negative results do not preclude COVID-19 infections and should not be used as the sole basis for diagnosis, treatment, or other management decisions. Testing for SARS CoV-2 is recommended only for patients who meet current clinical and/or epidemiological criteria defined by federal, state, or local public health directives.   RSV PCR - Normal    RSV PCR Not Detected       Narrative:     This assay is an FDA-cleared, in vitro diagnostic nucleic acid amplification test for the detection of RSV from nasopharyngeal specimens, and has been validated for use at [facility]. Negative results do not preclude RSV infections, and should not be used as the sole basis for diagnosis, treatment, or other management decisions. If Influenza A/B and RSV PCR results are negative, testing for Parainfluenza virus, Adenovirus and Metapneumovirus is routinely performed for pediatric oncology and intensive care inpatients at [facility], and is available on other patients by placing an add-on request.       TROPONIN I, HIGH SENSITIVITY - Normal    Troponin I, High Sensitivity 5      Narrative:     Less than 99th percentile of normal range cutoff-  Female and children under 18 years old <14 ng/L; Male <21 ng/L: Negative  Repeat testing should be performed if clinically indicated.     Female and children under 18 years old 14-50 ng/L; Male 21-50 ng/L:  Consistent with possible cardiac damage and possible increased clinical   risk. Serial measurements may help to assess extent of myocardial damage.     >50 ng/L: Consistent with cardiac damage, increased clinical risk and  myocardial infarction. Serial measurements may help assess extent of   myocardial damage.      NOTE: Children less than 1 year old may have higher baseline troponin   levels and results should be interpreted in conjunction with the overall   clinical context.     NOTE: Troponin I testing is performed using a different   testing methodology at [facility] than at other   [facility]. Direct result comparisons should only   be made within the same method.   BLOOD CULTURE   BLOOD CULTURE   URINALYSIS WITH REFLEX CULTURE AND MICROSCOPIC    Narrative:     The following orders were created for panel order Urinalysis with Reflex Culture and Microscopic.  Procedure                               Abnormality          Status                     ---------                               -----------         ------                     Urinalysis with Reflex C...[[id number]]  Abnormal            Final result               Extra Urine Gray Tube[[id number]]                            In process                   Please view results for these tests on the individual orders.   EXTRA URINE GRAY TUBE   URINALYSIS MICROSCOPIC WITH REFLEX CULTURE    WBC, Urine NONE      RBC, Urine 1-2      Mucus, Urine 1+       CT abdomen pelvis w IV contrast   Final Result   1. Patchy airspace disease within the visualized lingula and left   lower lobe most consistent with acute pneumonia.   2. Trace free fluid in the pelvis. The amount of fluid present has   significantly reduced when compared to the prior study. No acute   inflammatory process within the abdomen or pelvis.                  Signed by: Gael Dalton 1/21/2025 1:10 PM   Dictation workstation:   [id number]      XR chest 1 view   Final Result   No focal infiltrate or pneumothorax is identified.        MACRO:   None.        Signed by: Darin Lockwood 1/21/2025 11:44 AM   Dictation workstation:   [id number]      Point of Care Ultrasound    (Results Pending)         ED Course & Medical Decision Making     Medications   cefTRIAXone (Rocephin) 2 g in dextrose (iso) IV 50 mL (0 g intravenous Stopped 1/21/25 1415)   azithromycin (Zithromax) 500 mg in dextrose 5% 250 mL IV (490.1961 mg intravenous New Bag 1/21/25 1415)   ondansetron (Zofran) injection 4 mg (has no administration in time range)   sodium chloride 0.9 % bolus 1,000 mL (0 mL intravenous Stopped 1/21/25 1239)   acetaminophen (Tylenol) tablet 975 mg (975 mg oral Given 1/21/25 1059)   ibuprofen tablet 400 mg (400 mg oral Given 1/21/25 1059)   iohexol (OMNIPaque) 350 mg iodine/mL solution 75 mL (75 mL intravenous Given 1/21/25 1232)     Heart Rate:  []   Temperature:  [37.7 °C (99.9 °F)-39.6 °C (103.3 °F)]   Respirations:  [15-23]   BP:  "(100-118)/(52-73)   Height:  [180.3 cm (5' 11\")]   Weight:  [63.5 kg (140 lb)]   Pulse Ox:  [95 %-100 %]    ED Course as of 01/21/25 1422   Tue Jan 21, 2025   1053 Patient with sepsis orders placed at this time due to his vital signs and temperature.  Most likely source is viral secondary to him being around sick children and having upper respiratory-like symptoms however due to him complaining of some abdominal pain we will order CT abdomen pelvis to.  Antibiotics will be held at this time until we find an infectious source. [CJ]   1240 Still currently do not have a source for infection.  However patient vital signs responding appropriately.  Tachycardia is resolving and temperature is down to 99.9. [CJ]   1306 Patient twelve-lead EKG interpreted by myself shows sinus rhythm, intergrade 93, normal FL interval, normal axis, normal QRS duration, normal QT, no STEMI, nonspecific inferior lead change [WJ]   1325 CT does show left lower.  Patient will receive IV antibiotics to treat pneumonia at this time.  Patient be covered for community-acquired. [CJ]   1419 Patient had troponins rule out myocarditis.  Patient's workup today patient did have a slight leukocytosis at 11.4 with a left shift.  CMP was within normal limits.  Viral swabs are all negative.  Patient had CT abdomen and pelvis which did show a little bit of lower free fluid in the pelvis however this was improved from previous exams.  Urinalysis was negative.  Did have an incidental finding on CT that showed left lower pneumonia which was not seen on chest x-ray.  Patient was given IV antibiotics here in the emergency department.  Patient's vital signs have responded appropriately throughout visit.  His blood pressure has remained stable heart rate has come down temperature has come down.  Patient is 96% on room air.  At this time patient be discharged home with oral treatment for pneumonia.  Patient will be given oral amoxicillin as well as oral Zithromax.  " Patient and family agreed with the plan of care.  They are encouraged return to the emergency room immediately with any worsening symptoms. [CJ]      ED Course User Index  [CJ] Dheeraj Higgins PA-C  [WJ] Hugo Zapien          Diagnoses as of 01/21/25 1422   Pneumonia of left lower lobe due to infectious organism       Procedures   ECG 12 lead    Performed by: Dheeraj Higgins PA-C  Authorized by: Dheeraj Higgins PA-C    ECG interpreted by ED Physician in the absence of a cardiologist: yes    Rate:     ECG rate:  93  Rhythm:     Rhythm: sinus rhythm    ST segments:     ST segments:  Normal  T waves:     T waves: normal    Comments:      No STEMI      Diagnosis     1. Pneumonia of left lower lobe due to infectious organism        Disposition   Discharge    ED Prescriptions       Medication Sig Dispense Start Date End Date Auth. Provider    amoxicillin (Amoxil) 500 mg capsule Take 2 capsules (1,000 mg) by mouth 3 times a day for 10 days. 60 capsule 1/21/2025 1/31/2025 Dheeraj Higgins PA-C    azithromycin (Zithromax) 500 mg tablet Take 1 tablet (500 mg) by mouth once daily for 5 days. 5 tablet 1/21/2025 1/26/2025 Dheeraj Higgins PA-C    ondansetron (Zofran) 4 mg tablet Take 1 tablet (4 mg) by mouth every 6 hours for 3 days. 12 tablet 1/21/2025 1/24/2025 Dheeraj Higgins PA-C            Disclaimer: This note was dictated by speech recognition. Minor errors in transcription may be present. Please call if questions.       Dheeraj Higgins PA-C  01/21/25 1429

## 2025-01-21 NOTE — Clinical Note
Jose Anaya was seen and treated in our emergency department on 1/21/2025.  He may return to work on 01/24/2025.       If you have any questions or concerns, please don't hesitate to call.      Dheeraj Higgins PA-C

## 2025-01-22 LAB
ATRIAL RATE: 94 BPM
P AXIS: 80 DEGREES
PR INTERVAL: 147 MS
Q ONSET: 252 MS
QRS COUNT: 15 BEATS
QRS DURATION: 103 MS
QT INTERVAL: 334 MS
QTC CALCULATION(BAZETT): 416 MS
QTC FREDERICIA: 386 MS
R AXIS: 88 DEGREES
T AXIS: -8 DEGREES
T OFFSET: 419 MS
VENTRICULAR RATE: 93 BPM

## 2025-01-25 LAB
BACTERIA BLD CULT: NORMAL
BACTERIA BLD CULT: NORMAL
